# Patient Record
Sex: FEMALE | Race: AMERICAN INDIAN OR ALASKA NATIVE | NOT HISPANIC OR LATINO | Employment: UNEMPLOYED | URBAN - METROPOLITAN AREA
[De-identification: names, ages, dates, MRNs, and addresses within clinical notes are randomized per-mention and may not be internally consistent; named-entity substitution may affect disease eponyms.]

---

## 2017-03-07 ENCOUNTER — OFFICE VISIT (OUTPATIENT)
Dept: OBGYN | Facility: CLINIC | Age: 52
End: 2017-03-07
Attending: OBSTETRICS & GYNECOLOGY
Payer: COMMERCIAL

## 2017-03-07 VITALS — SYSTOLIC BLOOD PRESSURE: 110 MMHG | HEART RATE: 92 BPM | WEIGHT: 206.2 LBS | DIASTOLIC BLOOD PRESSURE: 70 MMHG

## 2017-03-07 DIAGNOSIS — N94.9 ADNEXAL CYST: Primary | ICD-10-CM

## 2017-03-07 RX ORDER — BENZTROPINE MESYLATE 1 MG/1
1 TABLET ORAL 2 TIMES DAILY
COMMUNITY

## 2017-03-07 RX ORDER — SERTRALINE HYDROCHLORIDE 100 MG/1
100 TABLET, FILM COATED ORAL DAILY
COMMUNITY

## 2017-03-07 RX ORDER — FLUTICASONE PROPIONATE 44 UG/1
1 AEROSOL, METERED RESPIRATORY (INHALATION) PRN
COMMUNITY
Start: 2017-01-27

## 2017-03-07 RX ORDER — CLONAZEPAM 1 MG/1
1 TABLET ORAL 2 TIMES DAILY
COMMUNITY
Start: 2009-11-18 | End: 2017-03-28

## 2017-03-07 RX ORDER — LISINOPRIL/HYDROCHLOROTHIAZIDE 10-12.5 MG
1 TABLET ORAL DAILY
COMMUNITY
Start: 2016-10-07

## 2017-03-07 RX ORDER — LORATADINE 10 MG/1
10 TABLET ORAL DAILY
COMMUNITY

## 2017-03-07 RX ORDER — ALBUTEROL SULFATE 90 UG/1
2 AEROSOL, METERED RESPIRATORY (INHALATION) PRN
COMMUNITY
Start: 2013-05-09

## 2017-03-07 RX ORDER — CLONAZEPAM 1 MG/1
1 TABLET ORAL 2 TIMES DAILY
COMMUNITY
Start: 2009-11-18

## 2017-03-07 RX ORDER — FLUTICASONE PROPIONATE 50 MCG
1 SPRAY, SUSPENSION (ML) NASAL PRN
COMMUNITY
Start: 2016-10-07

## 2017-03-07 RX ORDER — VARENICLINE TARTRATE 1 MG/1
1 TABLET, FILM COATED ORAL DAILY
COMMUNITY

## 2017-03-07 ASSESSMENT — PAIN SCALES - GENERAL: PAINLEVEL: SEVERE PAIN (6)

## 2017-03-07 NOTE — MR AVS SNAPSHOT
After Visit Summary   3/7/2017    Hanna Sawant    MRN: [id number]           Patient Information     Date Of Birth          1965        Visit Information        Provider Department      3/7/2017 1:45 PM Dalia Montez DO [facility]        Today's Diagnoses     Adnexal cyst    -  1       Follow-ups after your visit        Your next 10 appointments already scheduled     Mar 28, 2017  1:00 PM CDT   ULTRASOUND with [facility] ULTRASOUND   [facility] ([facility])    [facility] [address]  [address]  [facility] [id number] [phone]            Mar 28, 2017  1:30 PM CDT   Return Visit with Barbara Espino MD   [facility] ([facility])    [facility] [address]  [address]  [facility] [id number] [phone]              Future tests that were ordered for you today     Open Future Orders        Priority Expected Expires Ordered    US GYN Complete Transvaginal - 57811 (In Clinic) Routine 3/17/2017 7/5/2017 3/7/2017            Who to contact     Please call your clinic at [phone] to:    Ask questions about your health    Make or cancel appointments    Discuss your medicines    Learn about your test results    Speak to your doctor   If you have compliments or concerns about an experience at your clinic, or if you wish to file a complaint, please contact [facility] Patient Relations at [phone] or email us at [email or web address]         Additional Information About Your Visit        MyChart Information     Hiptype is an electronic gateway that provides easy, online access to your medical records. With Hiptype, you can request a clinic appointment, read your test results, renew a prescription or communicate with your care team.     To sign up for Hiptype visit the website at [email or web address]   You will  be asked to enter the access code listed below, as well as some personal information. Please follow the directions to create your username and password.     Your access code is: [email or web address]  Expires: 2017  9:00 AM     Your access code will  in 90 days. If you need help or a new code, please contact your [facility] or call [phone] for assistance.        Care EveryWhere ID     This is your Care EveryWhere ID. This could be used by other organizations to access your [place] medical records  [id number]        Your Vitals Were     Pulse                   92            Blood Pressure from Last 3 Encounters:   17 110/70    Weight from Last 3 Encounters:   17 93.5 kg (206 lb 3.2 oz)               Primary Care Provider Office Phone # Fax #    Sammi Powell [phone]       [place] [address] [address] [facility] [address]        Thank you!     Thank you for choosing [facility]  for your care. Our goal is always to provide you with excellent care. Hearing back from our patients is one way we can continue to improve our services. Please take a few minutes to complete the written survey that you may receive in the mail after your visit with us. Thank you!             Your Updated Medication List - Protect others around you: Learn how to safely use, store and throw away your medicines at www.disposemymeds.org.          This list is accurate as of: 3/7/17  2:38 PM.  Always use your most recent med list.                   Brand Name Dispense Instructions for use    albuterol 108 (90 BASE) MCG/ACT Inhaler    PROAIR HFA/PROVENTIL HFA/VENTOLIN HFA     Inhale 2 puffs into the lungs as needed       beclomethasone 40 MCG/ACT Inhaler    QVAR     Inhale 2 puffs into the lungs as needed       benztropine 1 MG tablet    COGENTIN     Take 1 mg by mouth 2 times daily       * clonazePAM 1 MG tablet    klonoPIN     Take 1 mg by mouth 2  times daily       * clonazePAM 1 MG tablet    klonoPIN     1 mg 2 times daily       fluticasone 44 MCG/ACT Inhaler    FLOVENT HFA     Inhale 1 puff into the lungs as needed       fluticasone 50 MCG/ACT spray    FLONASE     Spray 1 spray in nostril as needed       lisinopril-hydrochlorothiazide 10-12.5 MG per tablet    PRINZIDE/ZESTORETIC     Take 1 tablet by mouth daily       loratadine 10 MG tablet    CLARITIN     Take 10 mg by mouth daily       risperiDONE microspheres 12.5 MG Susr    risperDAL CONSTA     12.5 mg every 14 days       sertraline 100 MG tablet    ZOLOFT     Take 100 mg by mouth daily       varenicline 1 MG tablet    CHANTIX     Take 1 mg by mouth daily       * Notice:  This list has 2 medication(s) that are the same as other medications prescribed for you. Read the directions carefully, and ask your doctor or other care provider to review them with you.

## 2017-03-07 NOTE — PROGRESS NOTES
[facility]  Gynecology Visit    Reason for Consult: Bartholin cyst  Consulting Provider: [facility]    HPI:    Hanna Sawant is a 52 year old , here for evaluation of right labial cyst that she first noticed 6 months-1 year ago. Painful with walking an intercourse. No drainage. No fevers. No history of diabetes or immune diseases. No history of skin infections.     After exam of external genitalia revealed no labial cyst, patient stated that she believes she was actually referred to out clinic for further evaluation of an ovarian cyst seen on imaging at [facility]    GYN History  - LMP: No LMP recorded. Patient is postmenopausal. Menopause approximately 4 years ago.  - Menses: Were monthly, regular. No postmenopausal bleeding.  - Pap Smears: No known history of abnormal paps    Last pap: 2016 NILM  - Contraception: None  - Sexual Activity/Concerns: Not currently sexually active  - Hx STIs/UTIs: Possible STD when pregnant with her second child, remembers being treated with antibiotics.    OBHx  Obstetric History       T1      TAB0   SAB0   E0   M0   L7       # Outcome Date GA Lbr Candelario/2nd Weight Sex Delivery Anes PTL Lv   7 Term     F    LIVE BIRTH   6       CS-LTranv      5       CS-LTranv      4       CS-LTranv      3       CS-Classical      2       CS-Classical      1       CS-Classical             PMHx: Spinal stenosis, asthma, HTN  PSHx:  x6, right knee replacement  Meds:   Current Outpatient Prescriptions   Medication     clonazePAM (KLONOPIN) 1 MG tablet     albuterol (PROAIR HFA/PROVENTIL HFA/VENTOLIN HFA) 108 (90 BASE) MCG/ACT Inhaler     fluticasone (FLONASE) 50 MCG/ACT spray     clonazePAM (KLONOPIN) 1 MG tablet     fluticasone (FLOVENT HFA) 44 MCG/ACT Inhaler     lisinopril-hydrochlorothiazide (PRINZIDE/ZESTORETIC) 10-12.5 MG per tablet     benztropine (COGENTIN) 1 MG tablet     beclomethasone (QVAR) 40 MCG/ACT Inhaler      sertraline (ZOLOFT) 100 MG tablet     varenicline (CHANTIX) 1 MG tablet     loratadine (CLARITIN) 10 MG tablet     risperiDONE microspheres (RISPERDAL CONSTA) 12.5 MG SUSR     No current facility-administered medications for this visit.      Allergies:  Tramadol    SocHx: Homeless, stays at shelter on [address]. Smokes a few cigarettes/day, no alcohol or other drug use.    FamHx:  Breast cancer in maternal and paternal aunts. Cousin with ovarian cancer. No family history of endometrial cancer.    ROS: 10-Point ROS negative except as noted in HPI    Physical Exam  /70  Pulse 92  Wt 93.5 kg (206 lb 3.2 oz)  Gen: Well-appearing, NAD  Pulm: Non-labored breathing on room air  Abd: Sot, non-distended, non-tender. No masses or organomegaly.  Ext: No LE edema, extremities warm and well perfused    Pelvic:  A appearing external female genitalia. Normal hair distribution. 2 small warts near posterior aspect of introitus. No labial masses or tenderness.     Assessment/Plan:  Hanna Sawant is a 52 year old  female here for evaluation of labial cyst/ovarian cyst.    Labial cyst  - No labial cysts present on exam. Likely vulvar warts that patient has been noticing. Offered to burn them off in the clinic. Patient declined.    Ovarian cyst  - On review of care everywhere, 2 cm adnexal cyst seen on CT abd/pelvis at [facility] in July. Patient unsure if she has had further imaging. TVUS ordered to evaluate with follow up in clinic to follow.     Return to clinic in 1-2 weeks to review US results.     Staffed with Dr. Eloy Montez DO  Ob/Gyn PGY1  17 2:00 PM      The Patient was seen in [facility] by [facility].  I reviewed the history & exam. Assessment and plan were jointly made. I was present for the exam.     Wendy Lyons MD

## 2017-03-07 NOTE — LETTER
3/7/2017       RE: Hanna Sawant  [address] [address]     Dear Colleague,    Thank you for referring your patient, Hanna Sawant, to the [facility] at [facility]. Please see a copy of my visit note below.    [facility]  Gynecology Visit    Reason for Consult: Bartholin cyst  Consulting Provider: [facility]    HPI:    Hanna Sawant is a 52 year old , here for evaluation of right labial cyst that she first noticed 6 months-1 year ago. Painful with walking an intercourse. No drainage. No fevers. No history of diabetes or immune diseases. No history of skin infections.     After exam of external genitalia revealed no labial cyst, patient stated that she believes she was actually referred to out clinic for further evaluation of an ovarian cyst seen on imaging at [facility]    GYN History  - LMP: No LMP recorded. Patient is postmenopausal. Menopause approximately 4 years ago.  - Menses: Were monthly, regular. No postmenopausal bleeding.  - Pap Smears: No known history of abnormal paps    Last pap: 2016 NILM  - Contraception: None  - Sexual Activity/Concerns: Not currently sexually active  - Hx STIs/UTIs: Possible STD when pregnant with her second child, remembers being treated with antibiotics.    OBHx  Obstetric History       T1      TAB0   SAB0   E0   M0   L7       # Outcome Date GA Lbr Candelario/2nd Weight Sex Delivery Anes PTL Lv   7 Term     F    LIVE BIRTH   6       CS-LTranv      5       CS-LTranv      4       CS-LTranv      3       CS-Classical      2       CS-Classical      1       CS-Classical             PMHx: Spinal stenosis, asthma, HTN  PSHx:  x6, right knee replacement  Meds:   Current Outpatient Prescriptions   Medication     clonazePAM (KLONOPIN) 1 MG tablet     albuterol (PROAIR HFA/PROVENTIL HFA/VENTOLIN HFA) 108 (90 BASE) MCG/ACT Inhaler     fluticasone  (FLONASE) 50 MCG/ACT spray     clonazePAM (KLONOPIN) 1 MG tablet     fluticasone (FLOVENT HFA) 44 MCG/ACT Inhaler     lisinopril-hydrochlorothiazide (PRINZIDE/ZESTORETIC) 10-12.5 MG per tablet     benztropine (COGENTIN) 1 MG tablet     beclomethasone (QVAR) 40 MCG/ACT Inhaler     sertraline (ZOLOFT) 100 MG tablet     varenicline (CHANTIX) 1 MG tablet     loratadine (CLARITIN) 10 MG tablet     risperiDONE microspheres (RISPERDAL CONSTA) 12.5 MG SUSR     No current facility-administered medications for this visit.      Allergies:  Tramadol    SocHx: Homeless, stays at shelter on [address]. Smokes a few cigarettes/day, no alcohol or other drug use.    FamHx:  Breast cancer in maternal and paternal aunts. Cousin with ovarian cancer. No family history of endometrial cancer.    ROS: 10-Point ROS negative except as noted in HPI    Physical Exam  /70  Pulse 92  Wt 93.5 kg (206 lb 3.2 oz)  Gen: Well-appearing, NAD  Pulm: Non-labored breathing on room air  Abd: Sot, non-distended, non-tender. No masses or organomegaly.  Ext: No LE edema, extremities warm and well perfused    Pelvic:  A appearing external female genitalia. Normal hair distribution. 2 small warts near posterior aspect of introitus. No labial masses or tenderness.     Assessment/Plan:  Hanna Sawant is a 52 year old  female here for evaluation of labial cyst/ovarian cyst.    Labial cyst  - No labial cysts present on exam. Likely vulvar warts that patient has been noticing. Offered to burn them off in the clinic. Patient declined.    Ovarian cyst  - On review of care everywhere, 2 cm adnexal cyst seen on CT abd/pelvis at [facility] in July. Patient unsure if she has had further imaging. TVUS ordered to evaluate with follow up in clinic to follow.     Return to clinic in 1-2 weeks to review US results.     Staffed with Dr. Eloy Montez DO  Ob/Gyn PGY1  17 2:00 PM      The Patient was seen in [facility] by MARIA ESTHER  [facility].  I reviewed the history & exam. Assessment and plan were jointly made. I was present for the exam.     Wendy Lyons MD

## 2017-03-28 ENCOUNTER — OFFICE VISIT (OUTPATIENT)
Dept: OBGYN | Facility: CLINIC | Age: 52
End: 2017-03-28
Payer: COMMERCIAL

## 2017-03-28 VITALS
BODY MASS INDEX: 39.21 KG/M2 | DIASTOLIC BLOOD PRESSURE: 78 MMHG | SYSTOLIC BLOOD PRESSURE: 128 MMHG | WEIGHT: 207.7 LBS | HEART RATE: 79 BPM | HEIGHT: 61 IN

## 2017-03-28 DIAGNOSIS — N94.9 ADNEXAL CYST: ICD-10-CM

## 2017-03-28 DIAGNOSIS — R19.00 ABDOMINAL WALL BULGE: ICD-10-CM

## 2017-03-28 DIAGNOSIS — Z87.42 HX OF OVARIAN CYST: Primary | ICD-10-CM

## 2017-03-28 PROCEDURE — 76830 TRANSVAGINAL US NON-OB: CPT | Mod: ZF

## 2017-03-28 PROCEDURE — 99211 OFF/OP EST MAY X REQ PHY/QHP: CPT | Mod: ZF

## 2017-03-28 RX ORDER — VARENICLINE TARTRATE 1 MG/1
1 TABLET, FILM COATED ORAL
COMMUNITY

## 2017-03-28 ASSESSMENT — PAIN SCALES - GENERAL: PAINLEVEL: NO PAIN (0)

## 2017-03-28 NOTE — MR AVS SNAPSHOT
After Visit Summary   3/28/2017    Hanna Sawant    MRN: [id number]           Patient Information     Date Of Birth          1965        Visit Information        Provider Department      3/28/2017 1:00 PM [facility] ULTRASOUND [facility]        Today's Diagnoses     Adnexal cyst           Follow-ups after your visit        Future tests that were ordered for you today     Open Future Orders        Priority Expected Expires Ordered    US Abdomen Complete Routine 2017 3/28/2018 3/28/2017            Who to contact     Please call your clinic at [phone] to:    Ask questions about your health    Make or cancel appointments    Discuss your medicines    Learn about your test results    Speak to your doctor   If you have compliments or concerns about an experience at your clinic, or if you wish to file a complaint, please contact [facility] Patient Relations at [phone] or email us at [email or web address]         Additional Information About Your Visit        MyChart Information     Divine Cosmeticst is an electronic gateway that provides easy, online access to your medical records. With Welocalize, you can request a clinic appointment, read your test results, renew a prescription or communicate with your care team.     To sign up for Divine Cosmeticst visit the website at [email or web address]   You will be asked to enter the access code listed below, as well as some personal information. Please follow the directions to create your username and password.     Your access code is: [email or web address]  Expires: 2017 10:00 AM     Your access code will  in 90 days. If you need help or a new code, please contact your [facility] or call [phone] for assistance.        Care EveryWhere ID     This is your Care EveryWhere ID. This could be used by other organizations to access your [place] medical records  [id number]          Blood Pressure from Last 3 Encounters:   03/28/17 128/78   03/07/17 110/70    Weight from Last 3 Encounters:   03/28/17 94.2 kg (207 lb 11.2 oz)   03/07/17 93.5 kg (206 lb 3.2 oz)              We Performed the Following     US GYN Complete Transvaginal - [address] (In Clinic)        Primary Care Provider Office Phone # Fax Dayanara Powell [phone]       [place] [address] [facility] [address]        Thank you!     Thank you for choosing [facility]  for your care. Our goal is always to provide you with excellent care. Hearing back from our patients is one way we can continue to improve our services. Please take a few minutes to complete the written survey that you may receive in the mail after your visit with us. Thank you!             Your Updated Medication List - Protect others around you: Learn how to safely use, store and throw away your medicines at www.disposemymeds.org.          This list is accurate as of: 3/28/17  5:03 PM.  Always use your most recent med list.                   Brand Name Dispense Instructions for use    albuterol 108 (90 BASE) MCG/ACT Inhaler    PROAIR HFA/PROVENTIL HFA/VENTOLIN HFA     Inhale 2 puffs into the lungs as needed       beclomethasone 40 MCG/ACT Inhaler    QVAR     Inhale 2 puffs into the lungs as needed       benztropine 1 MG tablet    COGENTIN     Take 1 mg by mouth 2 times daily       clonazePAM 1 MG tablet    klonoPIN     1 mg 2 times daily       fluticasone 44 MCG/ACT Inhaler    FLOVENT HFA     Inhale 1 puff into the lungs as needed       fluticasone 50 MCG/ACT spray    FLONASE     Spray 1 spray in nostril as needed Reported on 3/28/2017       lisinopril-hydrochlorothiazide 10-12.5 MG per tablet    PRINZIDE/ZESTORETIC     Take 1 tablet by mouth daily       loratadine 10 MG tablet    CLARITIN     Take 10 mg by mouth daily       risperiDONE microspheres 12.5 MG Susr    risperDAL CONSTA     12.5 mg every 14 days        sertraline 100 MG tablet    ZOLOFT     Take 100 mg by mouth daily       * varenicline 1 MG tablet    CHANTIX     Take 1 mg by mouth daily       * varenicline 1 MG tablet    CHANTIX     Take 1 mg by mouth       * Notice:  This list has 2 medication(s) that are the same as other medications prescribed for you. Read the directions carefully, and ask your doctor or other care provider to review them with you.

## 2017-03-28 NOTE — MR AVS SNAPSHOT
After Visit Summary   3/28/2017    Hanna Sawant    MRN: [id number]           Patient Information     Date Of Birth          1965        Visit Information        Provider Department      3/28/2017 1:30 PM Barbara Espino MD [facility]        Care Instructions    - Follow up with your primary care doctor regarding hernia        Follow-ups after your visit        Follow-up notes from your care team     Return if symptoms worsen or fail to improve.      Who to contact     Please call your clinic at [phone] to:    Ask questions about your health    Make or cancel appointments    Discuss your medicines    Learn about your test results    Speak to your doctor   If you have compliments or concerns about an experience at your clinic, or if you wish to file a complaint, please contact [facility] Patient Relations at [phone] or email us at [email or web address]         Additional Information About Your Visit        MyChart Information     JK-Group is an electronic gateway that provides easy, online access to your medical records. With JK-Group, you can request a clinic appointment, read your test results, renew a prescription or communicate with your care team.     To sign up for CallidusCloudt visit the website at [email or web address]   You will be asked to enter the access code listed below, as well as some personal information. Please follow the directions to create your username and password.     Your access code is: [email or web address]  Expires: 2017 10:00 AM     Your access code will  in 90 days. If you need help or a new code, please contact your [facility] or call [phone] for assistance.        Care EveryWhere ID     This is your Care EveryWhere ID. This could be used by other organizations to access your [place] medical records  [id number]        Your Vitals Were     Pulse Height BMI (Body Mass  "Index)             79 1.549 m (5' 1\") 39.24 kg/m2          Blood Pressure from Last 3 Encounters:   03/28/17 128/78   03/07/17 110/70    Weight from Last 3 Encounters:   03/28/17 94.2 kg (207 lb 11.2 oz)   03/07/17 93.5 kg (206 lb 3.2 oz)              Today, you had the following     No orders found for display       Primary Care Provider Office Phone # Fax #    Sammi Powell [phone]       [facility] [address] [facility] [address]        Thank you!     Thank you for choosing [facility]  for your care. Our goal is always to provide you with excellent care. Hearing back from our patients is one way we can continue to improve our services. Please take a few minutes to complete the written survey that you may receive in the mail after your visit with us. Thank you!             Your Updated Medication List - Protect others around you: Learn how to safely use, store and throw away your medicines at www.disposemymeds.org.          This list is accurate as of: 3/28/17  1:42 PM.  Always use your most recent med list.                   Brand Name Dispense Instructions for use    albuterol 108 (90 BASE) MCG/ACT Inhaler    PROAIR HFA/PROVENTIL HFA/VENTOLIN HFA     Inhale 2 puffs into the lungs as needed       beclomethasone 40 MCG/ACT Inhaler    QVAR     Inhale 2 puffs into the lungs as needed       benztropine 1 MG tablet    COGENTIN     Take 1 mg by mouth 2 times daily       clonazePAM 1 MG tablet    klonoPIN     1 mg 2 times daily       fluticasone 44 MCG/ACT Inhaler    FLOVENT HFA     Inhale 1 puff into the lungs as needed       fluticasone 50 MCG/ACT spray    FLONASE     Spray 1 spray in nostril as needed Reported on 3/28/2017       lisinopril-hydrochlorothiazide 10-12.5 MG per tablet    PRINZIDE/ZESTORETIC     Take 1 tablet by mouth daily       loratadine 10 MG tablet    CLARITIN     Take 10 mg by mouth daily       risperiDONE microspheres 12.5 MG Susr    " risperDAL CONSTA     12.5 mg every 14 days       sertraline 100 MG tablet    ZOLOFT     Take 100 mg by mouth daily       * varenicline 1 MG tablet    CHANTIX     Take 1 mg by mouth daily       * varenicline 1 MG tablet    CHANTIX     Take 1 mg by mouth       * Notice:  This list has 2 medication(s) that are the same as other medications prescribed for you. Read the directions carefully, and ask your doctor or other care provider to review them with you.

## 2017-03-28 NOTE — PROGRESS NOTES
"[facility]  Follow-Up Visit    S: Ms. Hanna Sawant is a 52 year old  here for f/u of pelvic US. She was seen on 3/7/17 for possible Bartholin's cyst (found to be genital warts on exam) and also f/u of a 2cm right ovarian cyst visualized on CT in 2016 at [facility] [place]. See note from 3/7 for further detail.  - Feels well  - Denies any postmenopausal bleeding ever. Menopause at 47y/o  - Notes a \"bulge\" that happens when she rolls over sometimes. Something pops through RUQ area and eventually she is able to push it back in. It is moderately uncomfortable when this happens. Has hx of open cholecystectomy    O:  /78 (BP Location: Right arm, Patient Position: Chair, Cuff Size: Adult Regular)  Pulse 79  Ht 1.549 m (5' 1\")  Wt 94.2 kg (207 lb 11.2 oz)  BMI 39.24 kg/m2  Gen: Well-appearing, NAD  Abd: Soft, obese, non-tender. Open ccy scar in RUQ. No palpable bulge or defect in RUQ. Patient examined lying down, rolling onto site, and standing    Pelvic US  Uterine findings:  Presence: Visible Size: Normal 5.2 x 4.9 x 4.4 cm. Endometrium = 5.7 mm. Thickened   Cx length = 31.8 mm.      Flexion: Anteverted Position: Deviated left Margins: Smooth Shape: Normal  Contour: Regular Texture: Heterogeneous Cavity: Abnormal- thickened Masses: Abnormal  Posterior Myoma- 1.2 x 1.2 x 1.3cm     Pelvic findings:   Right Adnexa: Normal  Left Adnexa: Normal  Bladder: Normal       Cul - de - sac fluid: None     Ovarian follicles:  Right ovary: 2.3 x 1.8 x 2.3cm.     0 follicles     Left ovary: 2.7 x 2.5 x 1.8cm.     0 follicles    A/P:  Ms. Hanna Sawant is a 52 year old  here for f/u US.  1. Hx right adnexal cyst- no ovarian cyst visualized on today's US  2. Mildly thickened endometrium: EMS 5.7mm. No postmenopausal bleeding. Not thickened enough to warrant sampling without bleeding. Patient counseled on need for sampling if she develops bleeding in the future.  3. Possible hernia- Hx very concerning for hernia at former " cholecystectomy site. No hernia palpated on exam today. Abd US ordered to evaluate for hernia. If present, will sent to General Surgery for evaluation. If not, will have follow up with PCP    F/u PRN    Barbara Espino MD  Ob/Gyn, PGY-4  [phone]  3/28/2017, 1:32 PM       The Patient was seen in [facility] by BARBARA ESPINO.  I reviewed the history & exam. Assessment and plan were jointly made.    Wendy Lyons MD

## 2017-03-28 NOTE — PROGRESS NOTES
52 year old female presents for gynecologic ultrasound indicated by f/u adnexal cyst.  This study was done transvaginally.    Uterine findings:   Presence: Visible Size: Normal 5.2 x 4.9 x 4.4 cm.  Endometrium = 5.7 mm. Thickened    Cx length = 31.8 mm.      Flexion:  Anteverted Position: Deviated left Margins: Smooth Shape: Normal   Contour: Regular Texture: Heterogeneous Cavity: Abnormal- thickened Masses: Abnormal   Posterior Myoma- 1.2 x 1.2 x 1.3cm    Pelvic findings:    Right Adnexa: Normal   Left Adnexa: Normal   Bladder:  Normal         Cul - de - sac fluid: None    Ovarian follicles:   Right ovary:  2.3 x 1.8 x 2.3cm.     0 follicles     Left ovary:  2.7 x 2.5 x 1.8cm.     0 follicles       Comments:  Ovarian cyst has resolved. Slightly thickened EM stripe for  female. Clinical correlation recommended.     SANDRA Santiago MD, [facility] Staff  OB/GYN    3/28/2017  5:02 PM

## 2021-02-11 NOTE — NURSING NOTE
Referred here for barthilian cyst- more than six months.  Painful-  Soaking in tub makes it feel better   Information: Selecting Yes will display possible errors in your note based on the variables you have selected. This validation is only offered as a suggestion for you. PLEASE NOTE THAT THE VALIDATION TEXT WILL BE REMOVED WHEN YOU FINALIZE YOUR NOTE. IF YOU WANT TO FAX A PRELIMINARY NOTE YOU WILL NEED TO TOGGLE THIS TO 'NO' IF YOU DO NOT WANT IT IN YOUR FAXED NOTE.

## 2022-09-29 ENCOUNTER — MEDICAL CORRESPONDENCE (OUTPATIENT)
Dept: HEALTH INFORMATION MANAGEMENT | Facility: CLINIC | Age: 57
End: 2022-09-29

## 2022-10-11 ENCOUNTER — TRANSCRIBE ORDERS (OUTPATIENT)
Dept: OTHER | Age: 57
End: 2022-10-11

## 2022-10-11 DIAGNOSIS — E11.9 TYPE 2 DIABETES MELLITUS WITHOUT COMPLICATION, WITHOUT LONG-TERM CURRENT USE OF INSULIN (H): Primary | ICD-10-CM
